# Patient Record
Sex: FEMALE | Race: WHITE
[De-identification: names, ages, dates, MRNs, and addresses within clinical notes are randomized per-mention and may not be internally consistent; named-entity substitution may affect disease eponyms.]

---

## 2018-10-05 ENCOUNTER — HOSPITAL ENCOUNTER (OUTPATIENT)
Dept: HOSPITAL 92 - BICMRI | Age: 73
Discharge: HOME | End: 2018-10-05
Attending: NEUROLOGICAL SURGERY
Payer: MEDICARE

## 2018-10-05 DIAGNOSIS — M47.26: Primary | ICD-10-CM

## 2018-10-05 DIAGNOSIS — M99.83: ICD-10-CM

## 2018-10-05 DIAGNOSIS — M48.061: ICD-10-CM

## 2018-10-05 PROCEDURE — 72148 MRI LUMBAR SPINE W/O DYE: CPT

## 2018-10-05 NOTE — MRI
NONCONTRAST MRI LUMBAR SPINE:

 

DATE: 10/5/2018.

 

HISTORY: 

Lumbar radiculopathy.  There are findings of lower back pain and pain down the left leg.  

 

FINDINGS: 

Visualized retroperitoneal structures have a normal nonenhanced MRI appearance.

 

Here is mild atrophy of the paraspinous musculature.  

 

The conus medullaris is normal in appearance and terminates at the T12-L1 level.

 

Multiple level degenerative changes are seen in the lumbar spine with vacuum phenomenon seen in the m
ajority of the intervertebral disks with height loss present of the intervertebral disks.  There is o
verall normal signal intensity demonstrated in the bone marrow.  There is slightly right convex curva
ture of the lumbar spine.

 

T12-L1 level:  There is a broad-based disk-osteophyte complex which narrows the ventral subarachnoid 
space with mild generalized narrowing of the central spinal canal.  There is mild left-sided neural f
oraminal narrowing.  The right neural foramen is patent.

 

L1-2 level:  There is a broad-based disk-osteophyte complex.  Facet generative changes and ligamentou
s thickening are noted.  There is resultant moderate narrowing of the central spinal canal with mild 
right and moderate left-sided neural foraminal narrowing.  

 

L2-3 level:  There is a broad-based disk-osteophyte complex.  Facet hypertrophic changes and ligament
ous thickening are noted.  There is moderate narrowing of the central spinal canal.  There is minimal
 right and moderate left-sided neural foraminal narrowing.

 

L3-4 level:  There is a broad-based disk-osteophyte complex.  There are moderate facet hypertrophic c
hanges and prominent ligamentous thickening.  Findings result in moderate to severe narrowing of the 
central spinal canal.  There is mild bilateral neural foraminal narrowing greater on the right.

 

L4-5 level:  There is a broad-based disk-osteophyte complex present.  There are facet hypertrophic ch
anges and minimal ligamentous thickening.  There is mild prominence of epidural fat.  Constellation o
f findings results in moderate narrowing of the thecal sac.  There is mild to moderate right and mild
 left-sided neural foraminal narrowing.  

 

L5-S1 level:  There is no disk bulge or disk herniation.  The central spinal canal and neural foramen
 are patent.  There is evidence of a transitional vertebrae at the lumbosacral junction with fusion o
f the lateral masses of L5 and S1.

 

IMPRESSION: 

Multilevel degenerative changes within the lumbar spine with multilevel neural foraminal narrowing an
d central canal narrowing.  

 

POS: [facility]

## 2019-02-22 ENCOUNTER — HOSPITAL ENCOUNTER (EMERGENCY)
Dept: HOSPITAL 92 - ERS | Age: 74
Discharge: HOME | End: 2019-02-22
Payer: MEDICARE

## 2019-02-22 DIAGNOSIS — I10: Primary | ICD-10-CM

## 2019-02-22 DIAGNOSIS — Z79.899: ICD-10-CM

## 2019-02-22 DIAGNOSIS — Z79.82: ICD-10-CM

## 2019-02-22 DIAGNOSIS — E11.9: ICD-10-CM

## 2019-02-22 DIAGNOSIS — Z79.84: ICD-10-CM

## 2019-02-22 DIAGNOSIS — E78.5: ICD-10-CM

## 2019-02-22 PROCEDURE — 99283 EMERGENCY DEPT VISIT LOW MDM: CPT

## 2019-09-10 ENCOUNTER — HOSPITAL ENCOUNTER (OUTPATIENT)
Dept: HOSPITAL 92 - BICMRI | Age: 74
Discharge: HOME | End: 2019-09-10
Attending: ANESTHESIOLOGY
Payer: MEDICARE

## 2019-09-10 DIAGNOSIS — M47.816: ICD-10-CM

## 2019-09-10 DIAGNOSIS — M43.16: ICD-10-CM

## 2019-09-10 DIAGNOSIS — M48.062: Primary | ICD-10-CM

## 2019-09-10 PROCEDURE — 72148 MRI LUMBAR SPINE W/O DYE: CPT

## 2019-09-10 PROCEDURE — 72100 X-RAY EXAM L-S SPINE 2/3 VWS: CPT

## 2019-09-10 NOTE — MRI
LUMBAR SPINE MRI NONCONTRAST: 

9/10/10

 

INDICATION:

Lumbar stenosis and neurogenic claudication. 

 

COMPARISON: 

10/5/18.

 

FINDINGS: 

Note is made that the numbering system on this exam is different as the numbering system while  utili
zing the iliolumbar ligaments at the L5 anatomic level. There is a transitional, lumbarized S1 segmen
t with a rudimentary S1-2 disc space. This places termination of conus medullaris at the inferior L1 
level. There is no acute compression fracture or significant subluxation. 

 

Multilevel bilateral facet osteoarthritis is present, greatest at the mid to lower lumbar spine, mode
rate in degree. 

 

L5-S1: There is a broad based disc osteophyte with mild to moderate central canal stenosis, moderate 
right and mild left neural foraminal narrowing.

 

L4-5: Severe central canal stenosis is present as result of broad based disc  osteophyte, facet hyper
trophy and redundancy of the ligamentum flava.  There is mild to moderate bilateral neural foraminal 
stenosis.

 

L3-4: Moderate central canal stenosis due to left asymmetric broad based disc osteophyte. Mild to mod
erate left and mild right neural foraminal narrowing present. 

 

L2-3: Moderate central canal stenosis as result of broad based disc osteophyte. There is moderate lef
t and mild right neural foraminal narrowing.

 

L1-2: Moderate central canal stenosis as result of broad based disc osteophyte. There is moderate lef
t and mild to moderate right neural foraminal narrowing. 

 

T12-L1: No high grade central canal or foraminal stenosis. 

 

IMPRESSION: 

Multilevel prominent degenerative change throughout the lumbar spine, most pronounced at the L4-5 lev
el, with severe central canal stenosis. 

 

POS: [facility]

## 2019-09-10 NOTE — RAD
LUMBAR SPINE 3 VIEWS:

 

Date:  09/10/19 

 

Lateral views obtained with neutral, flexion, and extension positions. 

 

INDICATION:

Lumbar stenosis. Neurogenic claudication. Low back pain. 

 

Correlation made to MRI lumbar spine dated 10/05/18. 

 

FINDINGS:

Using the same nomenclature as on the prior MRI, L5 is a transitional vertebra. Prominent anterior os
teophytes are seen at the L4-L5 level with loss of disc space at L5-S1. There is an anterolisthesis a
t L3-4 which appears unchanged from the prior exam. There is loss of disc space at all levels of the 
lumbar spine with mild osteophytes at all levels. Facet hypertrophy. 

 

The anterolisthesis at L3-4 appears to exacerbate with extension. 

 

IMPRESSION: 

The prior MRI lumbar spine designated the transitional vertebra as L5. 

 

Following this nomenclature, there is anterolisthesis at L3-4 which appears to exacerbate with extens
ion. Degenerative changes as described above. 

 

 

POS: OFF

## 2019-12-04 ENCOUNTER — HOSPITAL ENCOUNTER (OUTPATIENT)
Dept: HOSPITAL 92 - BICMAMMO | Age: 74
Discharge: HOME | End: 2019-12-04
Attending: FAMILY MEDICINE
Payer: MEDICARE

## 2019-12-04 DIAGNOSIS — Z12.31: Primary | ICD-10-CM

## 2019-12-04 PROCEDURE — 77067 SCR MAMMO BI INCL CAD: CPT

## 2019-12-04 PROCEDURE — 77063 BREAST TOMOSYNTHESIS BI: CPT

## 2019-12-04 NOTE — MMO
Bilateral MAMMO Bilat Screen [facility].

 

CLINICAL HISTORY:

Patient is 73 years old and is seen for screening. The patient has no family

history of breast cancer.  The patient has no personal history of cancer. The

patient has a history of left needle biopsy in 2001 - benign.

 

VIEWS:

The views performed were:  bilateral craniocaudal with tomosynthesis and

bilateral mediolateral oblique with tomosynthesis.

 

FILMS COMPARED:

The present examination has been compared to prior imaging studies performed at

[facility] on 01/08/2015, 03/22/2016, 04/24/2017 and 07/12/2018.

 

This study has been interpreted with the assistance of computer-aided detection.

 

MAMMOGRAM FINDINGS:

There are scattered fibroglandular densities.

 

Finding 1:  There are stable benign appearing calcifications seen in both

breasts.

 

Finding 2:  There is a stable biopsy clip seen in the left breast.

 

There are no suspicious masses, suspicious calcifications, or new areas of

architectural distortion.

 

IMPRESSION:

THERE IS NO MAMMOGRAPHIC EVIDENCE OF MALIGNANCY.

 

A ROUTINE FOLLOW-UP MAMMOGRAM IN 1 YEAR IS RECOMMENDED.

 

THE RESULTS OF THIS EXAM WERE SENT TO THE PATIENT.

 

ACR BI-RADS Category 2 - Benign finding

 

MAMMOGRAPHY NOTE:

 1. A negative mammogram report should not delay a biopsy if a dominant of

 clinically suspicious mass is present.

 2. Approximately 10% to 15% of breast cancers are not detected by

 mammography.

 3. Adenosis and dense breasts may obscure an underlying neoplasm.

 

 

Reported by: VENKATESH CALDWELL MD

Electonically Signed: [id number]

## 2020-09-03 ENCOUNTER — HOSPITAL ENCOUNTER (OUTPATIENT)
Dept: HOSPITAL 92 - SDC | Age: 75
Discharge: HOME | End: 2020-09-03
Attending: ANESTHESIOLOGY
Payer: MEDICARE

## 2020-09-03 VITALS — BODY MASS INDEX: 45.7 KG/M2

## 2020-09-03 DIAGNOSIS — Z79.899: ICD-10-CM

## 2020-09-03 DIAGNOSIS — Z79.84: ICD-10-CM

## 2020-09-03 DIAGNOSIS — M54.16: ICD-10-CM

## 2020-09-03 DIAGNOSIS — Z88.2: ICD-10-CM

## 2020-09-03 DIAGNOSIS — Z79.82: ICD-10-CM

## 2020-09-03 DIAGNOSIS — Z88.1: ICD-10-CM

## 2020-09-03 DIAGNOSIS — Z88.0: ICD-10-CM

## 2020-09-03 DIAGNOSIS — G89.4: Primary | ICD-10-CM

## 2020-09-03 LAB
ANION GAP SERPL CALC-SCNC: 15 MMOL/L (ref 10–20)
BUN SERPL-MCNC: 42 MG/DL (ref 9.8–20.1)
CALCIUM SERPL-MCNC: 9.5 MG/DL (ref 7.8–10.44)
CHLORIDE SERPL-SCNC: 106 MMOL/L (ref 98–107)
CO2 SERPL-SCNC: 25 MMOL/L (ref 23–31)
CREAT CL PREDICTED SERPL C-G-VRATE: 55 ML/MIN (ref 70–130)
GLUCOSE SERPL-MCNC: 117 MG/DL (ref 83–110)
POTASSIUM SERPL-SCNC: 5 MMOL/L (ref 3.5–5.1)
SODIUM SERPL-SCNC: 141 MMOL/L (ref 136–145)

## 2020-09-03 PROCEDURE — S0020 INJECTION, BUPIVICAINE HYDRO: HCPCS

## 2020-09-03 PROCEDURE — C1778 LEAD, NEUROSTIMULATOR: HCPCS

## 2020-09-03 PROCEDURE — 00HU3MZ INSERTION OF NEUROSTIMULATOR LEAD INTO SPINAL CANAL, PERCUTANEOUS APPROACH: ICD-10-PCS | Performed by: ANESTHESIOLOGY

## 2020-09-03 PROCEDURE — 93010 ELECTROCARDIOGRAM REPORT: CPT

## 2020-09-03 PROCEDURE — 76000 FLUOROSCOPY <1 HR PHYS/QHP: CPT

## 2020-09-03 PROCEDURE — 63650 IMPLANT NEUROELECTRODES: CPT

## 2020-09-03 PROCEDURE — L8679 IMP NEUROSTI PLS GN ANY TYPE: HCPCS

## 2020-09-03 PROCEDURE — 0JH70DZ INSERTION OF MULTIPLE ARRAY STIMULATOR GENERATOR INTO BACK SUBCUTANEOUS TISSUE AND FASCIA, OPEN APPROACH: ICD-10-PCS | Performed by: ANESTHESIOLOGY

## 2020-09-03 PROCEDURE — 63685 INS/RPLC SPI NPG/RCVR POCKET: CPT

## 2020-09-03 PROCEDURE — 72020 X-RAY EXAM OF SPINE 1 VIEW: CPT

## 2020-09-03 PROCEDURE — 80048 BASIC METABOLIC PNL TOTAL CA: CPT

## 2020-09-03 PROCEDURE — 93005 ELECTROCARDIOGRAM TRACING: CPT

## 2020-09-03 NOTE — RAD
Radiograph thoracic spine 1 view:

9/3/2020



HISTORY:

74-year-old female with mid back pain. Spinal cord stimulator insertion.







FINDINGS:

Fluoroscopic spot images AP and lateral, small field-of-view with C-arm.



Double lead dorsal column spinal cord stimulator leads ascending the posterior aspect of the lower th
oracic spine. On the AP view, distal tip of one of them is marked as the T8 level. The other tip is

overlapping the superior endplate of the next lower level, which would be T9.



IMPRESSION:

Dorsal column spinal cord stimulator leads in the posterior aspect of spinal canal at mid thoracic sp
ine.



Reported By: Angelo Mejia 

Electronically Signed:  9/3/2020 2:42 PM

## 2020-09-03 NOTE — OP
DATE OF PROCEDURE:  09/03/2020



PREOPERATIVE DIAGNOSES:  

1. Chronic pain syndrome.

2. Lumbar radiculopathy.



POSTOPERATIVE DIAGNOSES:  

1. Chronic pain syndrome.

2. Lumbar radiculopathy.



PROCEDURES PERFORMED:  

1. Spinal cord stimulator generator implant.

2. Spinal cord stimulator lead implant x2, Medtronic 8-contact leads.



SPECIMENS:  No specimens removed.



ESTIMATED BLOOD LOSS:  Minimal blood loss.



DESCRIPTION OF PROCEDURE:  The patient was taken to the procedure room and placed

prone on the procedure room table.  A time-out was performed.  We prepped the back

with DuraPrep, sterile drapes were applied.  We used fluoroscopy to locate the

interspace of T12-L1 and L1-L2.  We anesthetized this with 0.5% Marcaine with

epinephrine and made an incision.  We blunt dissected this down to fascia.  We then

inserted a 14-gauge supplied Tuohy needle in a paramedian technique to make contact

with the T12-L1 interspinous ligament.  We used loss of resistance to air to achieve

access to the epidural space.  Aspiration was negative for heme or CSF.  We inserted

the 8-contact stimulator lead through the needle and up the midline dorsal epidural

space to the midbody of T8 on the right and the top of T9 on the left.  Stimulation

was performed with the patient awake and she noted stimulation in all pain areas.

We then took the needles out taking care not to remove the leads or move the leads.

We inserted an anchor over the lead and affixed this to the lead.  We then used 2-0

silk suture x2 on each anchor to secure them to the fascia.  We tugged on and during

live fluoro, there was no movement.  We then anesthetized a pocket area in the right

upper buttock with Marcaine 0.5% with epinephrine.  We then made an incision and

blunt dissected this down to Chelsie's fascia.  We blunt dissected superior and

inferior to create a pocket.  We used a tunneling device to make a tunnel between

the 2 incisions.  We threaded the leads through the tunneling device and brought

into the battery pocket.  We connected leads to the battery and used a torque wrench

to affix them to the battery.  Impedances were checked, which were all good.  The

battery was flipped inside the pocket.  We approximated the fascial layers and

multiple layers using 2-0 Vicryl sutures in simple interrupted fashion and also

horizontal mattress stitches.  We then used a 3-0 Vicryl Rapide to create a

subcuticular stitch to approximate the skin.  We then used Dermabond as an occlusive

dressing.  Once this was dry, we placed sterile 4x4s as well as Medipore tape over

the wounds.  The patient was taken to Day Stay under stable condition. 







Job ID:  [id number]

## 2021-04-15 ENCOUNTER — HOSPITAL ENCOUNTER (OUTPATIENT)
Dept: HOSPITAL 92 - BICMAMMO | Age: 76
Discharge: HOME | End: 2021-04-15
Attending: FAMILY MEDICINE
Payer: MEDICARE

## 2021-04-15 DIAGNOSIS — Z12.31: Primary | ICD-10-CM

## 2021-04-15 DIAGNOSIS — Z91.89: ICD-10-CM

## 2021-04-15 PROCEDURE — 77063 BREAST TOMOSYNTHESIS BI: CPT

## 2021-04-15 PROCEDURE — 77067 SCR MAMMO BI INCL CAD: CPT

## 2021-05-07 ENCOUNTER — HOSPITAL ENCOUNTER (EMERGENCY)
Dept: HOSPITAL 92 - ERS | Age: 76
Discharge: HOME | End: 2021-05-07
Payer: MEDICARE

## 2021-05-07 DIAGNOSIS — Z79.82: ICD-10-CM

## 2021-05-07 DIAGNOSIS — E11.9: ICD-10-CM

## 2021-05-07 DIAGNOSIS — M79.7: ICD-10-CM

## 2021-05-07 DIAGNOSIS — E78.5: ICD-10-CM

## 2021-05-07 DIAGNOSIS — M54.6: Primary | ICD-10-CM

## 2021-05-07 DIAGNOSIS — Z79.899: ICD-10-CM

## 2021-05-07 DIAGNOSIS — I10: ICD-10-CM

## 2021-05-07 LAB
ALBUMIN SERPL BCG-MCNC: 3.5 G/DL (ref 3.4–4.8)
ALP SERPL-CCNC: 59 U/L (ref 40–110)
ALT SERPL W P-5'-P-CCNC: 14 U/L (ref 8–55)
ANION GAP SERPL CALC-SCNC: 13 MMOL/L (ref 10–20)
AST SERPL-CCNC: 15 U/L (ref 5–34)
BASOPHILS # BLD AUTO: 0.1 THOU/UL (ref 0–0.2)
BASOPHILS NFR BLD AUTO: 1 % (ref 0–1)
BILIRUB SERPL-MCNC: 0.3 MG/DL (ref 0.2–1.2)
BUN SERPL-MCNC: 28 MG/DL (ref 9.8–20.1)
CALCIUM SERPL-MCNC: 10.1 MG/DL (ref 7.8–10.44)
CHLORIDE SERPL-SCNC: 108 MMOL/L (ref 98–107)
CO2 SERPL-SCNC: 24 MMOL/L (ref 23–31)
CREAT CL PREDICTED SERPL C-G-VRATE: 0 ML/MIN (ref 70–130)
EOSINOPHIL # BLD AUTO: 0.1 THOU/UL (ref 0–0.7)
EOSINOPHIL NFR BLD AUTO: 2.5 % (ref 0–10)
GLOBULIN SER CALC-MCNC: 3.9 G/DL (ref 2.4–3.5)
GLUCOSE SERPL-MCNC: 106 MG/DL (ref 83–110)
HGB BLD-MCNC: 10.2 G/DL (ref 12–16)
LYMPHOCYTES # BLD: 1.2 THOU/UL (ref 1.2–3.4)
LYMPHOCYTES NFR BLD AUTO: 22.8 % (ref 21–51)
MCH RBC QN AUTO: 26 PG (ref 27–31)
MCV RBC AUTO: 85.5 FL (ref 78–98)
MONOCYTES # BLD AUTO: 0.6 THOU/UL (ref 0.11–0.59)
MONOCYTES NFR BLD AUTO: 11.9 % (ref 0–10)
NEUTROPHILS # BLD AUTO: 3.3 THOU/UL (ref 1.4–6.5)
NEUTROPHILS NFR BLD AUTO: 61.8 % (ref 42–75)
PLATELET # BLD AUTO: 154 THOU/UL (ref 130–400)
POTASSIUM SERPL-SCNC: 5.1 MMOL/L (ref 3.5–5.1)
RBC # BLD AUTO: 3.92 MILL/UL (ref 4.2–5.4)
SODIUM SERPL-SCNC: 140 MMOL/L (ref 136–145)
WBC # BLD AUTO: 5.4 THOU/UL (ref 4.8–10.8)

## 2021-05-07 PROCEDURE — 84484 ASSAY OF TROPONIN QUANT: CPT

## 2021-05-07 PROCEDURE — 36415 COLL VENOUS BLD VENIPUNCTURE: CPT

## 2021-05-07 PROCEDURE — 96375 TX/PRO/DX INJ NEW DRUG ADDON: CPT

## 2021-05-07 PROCEDURE — 80053 COMPREHEN METABOLIC PANEL: CPT

## 2021-05-07 PROCEDURE — 85025 COMPLETE CBC W/AUTO DIFF WBC: CPT

## 2021-05-07 PROCEDURE — 93005 ELECTROCARDIOGRAM TRACING: CPT

## 2021-05-07 PROCEDURE — 96374 THER/PROPH/DIAG INJ IV PUSH: CPT

## 2021-05-07 PROCEDURE — 71045 X-RAY EXAM CHEST 1 VIEW: CPT
